# Patient Record
Sex: MALE | Race: WHITE | NOT HISPANIC OR LATINO | Employment: FULL TIME | URBAN - NONMETROPOLITAN AREA
[De-identification: names, ages, dates, MRNs, and addresses within clinical notes are randomized per-mention and may not be internally consistent; named-entity substitution may affect disease eponyms.]

---
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
## 2022-03-02 ENCOUNTER — APPOINTMENT (OUTPATIENT)
Dept: GENERAL RADIOLOGY | Facility: HOSPITAL | Age: 22
End: 2022-03-02

## 2022-03-02 ENCOUNTER — HOSPITAL ENCOUNTER (EMERGENCY)
Facility: HOSPITAL | Age: 22
Discharge: HOME | End: 2022-03-02
Attending: INTERNAL MEDICINE | Admitting: INTERNAL MEDICINE

## 2022-03-02 VITALS
WEIGHT: 180 LBS | HEART RATE: 62 BPM | TEMPERATURE: 98.7 F | RESPIRATION RATE: 16 BRPM | SYSTOLIC BLOOD PRESSURE: 109 MMHG | BODY MASS INDEX: 27.28 KG/M2 | OXYGEN SATURATION: 97 % | HEIGHT: 68 IN | DIASTOLIC BLOOD PRESSURE: 66 MMHG

## 2022-03-02 DIAGNOSIS — S93.402A SPRAIN OF LEFT ANKLE, UNSPECIFIED LIGAMENT, INITIAL ENCOUNTER: Primary | ICD-10-CM

## 2022-03-02 PROCEDURE — 73610 X-RAY EXAM OF ANKLE: CPT

## 2022-03-02 PROCEDURE — 73630 X-RAY EXAM OF FOOT: CPT

## 2022-03-02 PROCEDURE — 99283 EMERGENCY DEPT VISIT LOW MDM: CPT
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
## 2022-03-02 NOTE — ED NOTES
Pt states he was at work, mis stepped into a hole, heard ankle pop. Swelling is displayed on pt left ankle. Pain 7/10     Cira Nunez, RN  03/02/22 5218

## 2022-03-02 NOTE — ED NOTES
Spoke to Bertha in [facility] [employer]. She spoke to pt employer supervisor- she informed me the pt employer is requesting a drug test to be completed before discharged. I will inform Dr Berry regarding this.     Cira Nunez, RN  03/02/22 024

## 2022-07-01 ENCOUNTER — HOSPITAL ENCOUNTER (EMERGENCY)
Facility: HOSPITAL | Age: 22
Discharge: LEFT BEFORE DISCHARGE | End: 2022-07-01

## 2022-07-01 VITALS
SYSTOLIC BLOOD PRESSURE: 132 MMHG | WEIGHT: 175 LBS | OXYGEN SATURATION: 97 % | HEIGHT: 68 IN | BODY MASS INDEX: 26.52 KG/M2 | RESPIRATION RATE: 17 BRPM | DIASTOLIC BLOOD PRESSURE: 83 MMHG | HEART RATE: 69 BPM | TEMPERATURE: 98.2 F

## 2022-07-01 LAB
ALBUMIN SERPL-MCNC: 4.9 G/DL (ref 3.5–5.2)
ALBUMIN/GLOB SERPL: 1.8 G/DL
ALP SERPL-CCNC: 108 U/L (ref 39–117)
ALT SERPL W P-5'-P-CCNC: 10 U/L (ref 1–41)
ANION GAP SERPL CALCULATED.3IONS-SCNC: 10 MMOL/L (ref 5–15)
AST SERPL-CCNC: 19 U/L (ref 1–40)
BACTERIA UR QL AUTO: ABNORMAL /HPF
BASOPHILS # BLD AUTO: 0.03 10*3/MM3 (ref 0–0.2)
BASOPHILS NFR BLD AUTO: 0.2 % (ref 0–1.5)
BILIRUB SERPL-MCNC: 0.7 MG/DL (ref 0–1.2)
BILIRUB UR QL STRIP: ABNORMAL
BUN SERPL-MCNC: 11 MG/DL (ref 6–20)
BUN/CREAT SERPL: 13.3 (ref 7–25)
CALCIUM SPEC-SCNC: 9.6 MG/DL (ref 8.6–10.5)
CHLORIDE SERPL-SCNC: 102 MMOL/L (ref 98–107)
CLARITY UR: CLEAR
CO2 SERPL-SCNC: 25 MMOL/L (ref 22–29)
COLOR UR: ABNORMAL
CREAT SERPL-MCNC: 0.83 MG/DL (ref 0.76–1.27)
DEPRECATED RDW RBC AUTO: 34.9 FL (ref 37–54)
EGFRCR SERPLBLD CKD-EPI 2021: 128.5 ML/MIN/1.73
EOSINOPHIL # BLD AUTO: 0.07 10*3/MM3 (ref 0–0.4)
EOSINOPHIL NFR BLD AUTO: 0.5 % (ref 0.3–6.2)
ERYTHROCYTE [DISTWIDTH] IN BLOOD BY AUTOMATED COUNT: 11.4 % (ref 12.3–15.4)
GLOBULIN UR ELPH-MCNC: 2.7 GM/DL
GLUCOSE SERPL-MCNC: 148 MG/DL (ref 65–99)
GLUCOSE UR STRIP-MCNC: NEGATIVE MG/DL
HCT VFR BLD AUTO: 52.6 % (ref 37.5–51)
HGB BLD-MCNC: 18.6 G/DL (ref 13–17.7)
HGB UR QL STRIP.AUTO: NEGATIVE
HOLD SPECIMEN: NORMAL
HOLD SPECIMEN: NORMAL
HYALINE CASTS UR QL AUTO: ABNORMAL /LPF
IMM GRANULOCYTES # BLD AUTO: 0.06 10*3/MM3 (ref 0–0.05)
IMM GRANULOCYTES NFR BLD AUTO: 0.4 % (ref 0–0.5)
KETONES UR QL STRIP: ABNORMAL
LEUKOCYTE ESTERASE UR QL STRIP.AUTO: ABNORMAL
LIPASE SERPL-CCNC: 14 U/L (ref 13–60)
LYMPHOCYTES # BLD AUTO: 2.29 10*3/MM3 (ref 0.7–3.1)
LYMPHOCYTES NFR BLD AUTO: 15.3 % (ref 19.6–45.3)
MCH RBC QN AUTO: 30.3 PG (ref 26.6–33)
MCHC RBC AUTO-ENTMCNC: 35.4 G/DL (ref 31.5–35.7)
MCV RBC AUTO: 85.7 FL (ref 79–97)
MONOCYTES # BLD AUTO: 0.79 10*3/MM3 (ref 0.1–0.9)
MONOCYTES NFR BLD AUTO: 5.3 % (ref 5–12)
NEUTROPHILS NFR BLD AUTO: 11.76 10*3/MM3 (ref 1.7–7)
NEUTROPHILS NFR BLD AUTO: 78.3 % (ref 42.7–76)
NITRITE UR QL STRIP: NEGATIVE
NRBC BLD AUTO-RTO: 0 /100 WBC (ref 0–0.2)
PH UR STRIP.AUTO: 5.5 [PH] (ref 5–8)
PLATELET # BLD AUTO: 304 10*3/MM3 (ref 140–450)
PMV BLD AUTO: 8.8 FL (ref 6–12)
POTASSIUM SERPL-SCNC: 4 MMOL/L (ref 3.5–5.2)
PROT SERPL-MCNC: 7.6 G/DL (ref 6–8.5)
PROT UR QL STRIP: ABNORMAL
RBC # BLD AUTO: 6.14 10*6/MM3 (ref 4.14–5.8)
RBC # UR STRIP: ABNORMAL /HPF
REF LAB TEST METHOD: ABNORMAL
SODIUM SERPL-SCNC: 137 MMOL/L (ref 136–145)
SP GR UR STRIP: >1.03 (ref 1–1.03)
SQUAMOUS #/AREA URNS HPF: ABNORMAL /HPF
UROBILINOGEN UR QL STRIP: ABNORMAL
WBC # UR STRIP: ABNORMAL /HPF
WBC NRBC COR # BLD: 15 10*3/MM3 (ref 3.4–10.8)
WHOLE BLOOD HOLD COAG: NORMAL
WHOLE BLOOD HOLD SPECIMEN: NORMAL

## 2022-07-01 PROCEDURE — 85025 COMPLETE CBC W/AUTO DIFF WBC: CPT | Performed by: FAMILY MEDICINE

## 2022-07-01 PROCEDURE — 81001 URINALYSIS AUTO W/SCOPE: CPT | Performed by: FAMILY MEDICINE

## 2022-07-01 PROCEDURE — 99211 OFF/OP EST MAY X REQ PHY/QHP: CPT

## 2022-07-01 PROCEDURE — 80053 COMPREHEN METABOLIC PANEL: CPT | Performed by: FAMILY MEDICINE

## 2022-07-01 PROCEDURE — 83690 ASSAY OF LIPASE: CPT | Performed by: FAMILY MEDICINE

## 2022-07-01 RX ORDER — SODIUM CHLORIDE 0.9 % (FLUSH) 0.9 %
10 SYRINGE (ML) INJECTION AS NEEDED
Status: DISCONTINUED | OUTPATIENT
Start: 2022-07-01 | End: 2022-07-01 | Stop reason: HOSPADM

## 2022-07-02 NOTE — ED NOTES
Pt states he is feeling better and whatever was wrong seems to be passing. He does not want to stay. Family is asking about billing. This nurse talked to registration who suggested they call the billing office. Informed her they will get a bill for lab work that was done but unknown if insurance will cover it since pt is choosing to leave. She verbalized understanding. Pt ambulatory out of ER with family. NAD.

## 2022-07-11 ENCOUNTER — OFFICE VISIT (OUTPATIENT)
Dept: OTOLARYNGOLOGY | Facility: CLINIC | Age: 22
End: 2022-07-11

## 2022-07-11 ENCOUNTER — TELEPHONE (OUTPATIENT)
Dept: OTOLARYNGOLOGY | Facility: CLINIC | Age: 22
End: 2022-07-11

## 2022-07-11 VITALS — HEIGHT: 69 IN | WEIGHT: 192 LBS | TEMPERATURE: 97.8 F | BODY MASS INDEX: 28.44 KG/M2

## 2022-07-11 DIAGNOSIS — H65.32 CHRONIC MUCOID OTITIS MEDIA OF LEFT EAR: Primary | ICD-10-CM

## 2022-07-11 DIAGNOSIS — Z96.22 S/P MYRINGOTOMY WITH INSERTION OF TUBE: ICD-10-CM

## 2022-07-11 PROCEDURE — 99213 OFFICE O/P EST LOW 20 MIN: CPT | Performed by: NURSE PRACTITIONER

## 2022-07-11 RX ORDER — CIPROFLOXACIN AND DEXAMETHASONE 3; 1 MG/ML; MG/ML
4 SUSPENSION/ DROPS AURICULAR (OTIC) 4 TIMES DAILY
Qty: 7.5 ML | Refills: 0 | Status: SHIPPED | OUTPATIENT
Start: 2022-07-11 | End: 2022-07-21

## 2022-07-11 RX ORDER — AMOXICILLIN AND CLAVULANATE POTASSIUM 875; 125 MG/1; MG/1
1 TABLET, FILM COATED ORAL 2 TIMES DAILY
Qty: 20 TABLET | Refills: 0 | Status: SHIPPED | OUTPATIENT
Start: 2022-07-11 | End: 2022-07-21

## 2022-07-11 NOTE — TELEPHONE ENCOUNTER
Mother notified office of patient ear complaints. Call placed to patient and notified of prescription of Ciprodex being called iun and appointment on 8/4/2022 at 0915. Patient verbalized understanding.

## 2022-07-11 NOTE — PATIENT INSTRUCTIONS
Use ciprodex as given via telephone this morning  Take oral antibiotics as directed  Call for any new/worsening problems    CONTACT INFORMATION:  The main office phone number is [phone]. For emergencies after hours and on weekends, this number will convert over to our answering service and the on call provider will answer. Please try to keep non emergent phone calls/ questions to office hours 9am-5pm Monday through Friday.      WEEZEVENT  As an alternative, you can sign up and use the Epic MyChart system for more direct and quicker access for non emergent questions/ problems.  [employer] allows you to send messages to your doctor, view your test results, renew your prescriptions, schedule appointments, and more. To sign up, go to [employer] and click on the Sign Up Now link in the New User? box. Enter your WEEZEVENT Activation Code exactly as it appears below along with the last four digits of your Social Security Number and your Date of Birth () to complete the sign-up process. If you do not sign up before the expiration date, you must request a new code.     WEEZEVENT Activation Code: Activation code not generated  Current WEEZEVENT Status: Active     If you have questions, you can email [email or web address] or call [phone] to talk to our WEEZEVENT staff. Remember, WEEZEVENT is NOT to be used for urgent needs. For medical emergencies, dial 911.     IF YOU SMOKE OR USE TOBACCO PLEASE READ THE FOLLOWING:  Why is smoking bad for me?  Smoking increases the risk of heart disease, lung disease, vascular disease, stroke, and cancer. If you smoke, STOP!        IF YOU SMOKE OR USE TOBACCO PLEASE READ THE FOLLOWING:  Why is smoking bad for me?  Smoking increases the risk of heart disease, lung disease, vascular disease, stroke, and cancer. If you smoke, STOP!     For more information:  Quit Now [place]  -QUIT-NOW  https://[email or web address].quitlogix.org/en-US/

## 2022-07-11 NOTE — PROGRESS NOTES
YOB: 2000  Location: [facility]  Location Address: [address] [place] [address], [facility] [address], [address] [place] [address]  Location Phone: [phone]    Chief Complaint   Patient presents with   • Earache     Left ear    • Ear Drainage     Left ear       History of Present Illness  Dustin Gómez is a 22 y.o. male.  Dustin Gómez is here for follow up of ENT complaints. The patient has had problems with ear pain and drainage. He is s/p bilateral myringotomy in 2018.The symptoms are localized to the left ear. The patient has had mild to moderate symptoms. The symptoms have been present since last night. There have been no identified factors that aggravate the symptoms. There have been no factors that have improved the symptoms.    Procedures  Past Medical History:   Diagnosis Date   • Acute suppurative otitis media without spontaneous rupture of ear drum     unspecified ear   • Allergic rhinitis    • Anxiety and depression    • Chronic mucoid otitis media     Simple or Unspecified   • Eustachian tube dysfunction        Past Surgical History:   Procedure Laterality Date   • ADENOIDECTOMY     • MYRINGOTOMY W/ TUBES Bilateral 2016    11/15/2012::multiple (7 sets total)   • MYRINGOTOMY W/ TUBES Bilateral 2018    Procedure: MYRINGOTOMY WITH INSERTION OF EAR TUBES;  Surgeon: Darren Sarmiento MD;  Location: [facility];  Service:    • TONSILLECTOMY AND ADENOIDECTOMY     • TYMPANOSTOMY TUBE PLACEMENT         Outpatient Medications Marked as Taking for the 22 encounter (Office Visit) with Conrad Jamison APRN   Medication Sig Dispense Refill   • ciprofloxacin-dexamethasone (CIPRODEX) 0.3-0.1 % otic suspension Administer 4 drops into ear(s) as directed by provider 4 (Four) Times a Day for 10 days. 7.5 mL 0   • citalopram (CeleXA) 20 MG tablet Take 40 mg by mouth Daily.         Patient has no known allergies.    Family History   Problem Relation Age of Onset   • No Known Problems Mother    • No Known  Problems Father    • Heart disease Paternal Grandfather    • No Known Problems Brother    • No Known Problems Brother        Social History     Socioeconomic History   • Marital status: Single   Tobacco Use   • Smoking status: Current Every Day Smoker     Types: Electronic Cigarette   • Smokeless tobacco: Never Used   • Tobacco comment: Vapes   Vaping Use   • Vaping Use: Every day   • Substances: Nicotine   Substance and Sexual Activity   • Alcohol use: Never   • Drug use: No   • Sexual activity: Never       Review of Systems   Constitutional: Negative.    HENT: Positive for ear discharge and ear pain.    Respiratory: Negative.    Cardiovascular: Negative.    Gastrointestinal: Negative.    Genitourinary: Negative.    Musculoskeletal: Negative.    Skin: Negative.    Neurological: Negative.        Vitals:    07/11/22 1119   Temp: 97.8 °F (36.6 °C)       Body mass index is 28.35 kg/m².    Objective     Physical Exam  Vitals reviewed.   HENT:      Head: Normocephalic.      Right Ear: Hearing, ear canal and external ear normal. Tympanic membrane is scarred.      Left Ear: A PE tube is present. Tympanic membrane is erythematous.      Ears:      Comments: Mucoid drainage covering PE tube, cleared with suction.     Nose: Nose normal.      Mouth/Throat:      Lips: Pink.      Mouth: Mucous membranes are moist.   Neurological:      Mental Status: He is alert.         Assessment & Plan   Diagnoses and all orders for this visit:    1. Chronic mucoid otitis media of left ear (Primary)    2. S/P myringotomy with insertion of tube    Other orders  -     amoxicillin-clavulanate (Augmentin) 875-125 MG per tablet; Take 1 tablet by mouth 2 (Two) Times a Day for 10 days.  Dispense: 20 tablet; Refill: 0      * Surgery not found *  No orders of the defined types were placed in this encounter.    Return in about 10 days (around 7/21/2022) for Recheck.     Use ciprodex as given via telephone this morning  Take oral antibiotics as  directed  Call for any new/worsening problems    Patient Instructions   Use ciprodex as given via telephone this morning  Take oral antibiotics as directed  Call for any new/worsening problems    CONTACT INFORMATION:  The main office phone number is [phone]. For emergencies after hours and on weekends, this number will convert over to our answering service and the on call provider will answer. Please try to keep non emergent phone calls/ questions to office hours 9am-5pm Monday through Friday.      Helleroy  As an alternative, you can sign up and use the Epic MyChart system for more direct and quicker access for non emergent questions/ problems.  [facility] [place] [employer] allows you to send messages to your doctor, view your test results, renew your prescriptions, schedule appointments, and more. To sign up, go to [facility] and click on the Sign Up Now link in the New User? box. Enter your Helleroy Activation Code exactly as it appears below along with the last four digits of your Social Security Number and your Date of Birth () to complete the sign-up process. If you do not sign up before the expiration date, you must request a new code.     [employer]oy Activation Code: Activation code not generated  Current Helleroy Status: Active     If you have questions, you can email [email or web address] or call [phone] to talk to our Helleroy staff. Remember, Helleroy is NOT to be used for urgent needs. For medical emergencies, dial 911.     IF YOU SMOKE OR USE TOBACCO PLEASE READ THE FOLLOWING:  Why is smoking bad for me?  Smoking increases the risk of heart disease, lung disease, vascular disease, stroke, and cancer. If you smoke, STOP!        IF YOU SMOKE OR USE TOBACCO PLEASE READ THE FOLLOWING:  Why is smoking bad for me?  Smoking increases the risk of heart disease, lung disease, vascular disease, stroke, and cancer. If you smoke, STOP!     For more information:  Quit Now  [place]  1-800-QUIT-NOW  https://[place].quitlogix.org/en-US/

## 2022-07-22 ENCOUNTER — OFFICE VISIT (OUTPATIENT)
Dept: OTOLARYNGOLOGY | Facility: CLINIC | Age: 22
End: 2022-07-22

## 2022-07-22 VITALS
TEMPERATURE: 98 F | WEIGHT: 192 LBS | HEIGHT: 69 IN | HEART RATE: 70 BPM | RESPIRATION RATE: 20 BRPM | SYSTOLIC BLOOD PRESSURE: 116 MMHG | DIASTOLIC BLOOD PRESSURE: 74 MMHG | BODY MASS INDEX: 28.44 KG/M2

## 2022-07-22 DIAGNOSIS — H69.83 DYSFUNCTION OF BOTH EUSTACHIAN TUBES: Primary | ICD-10-CM

## 2022-07-22 DIAGNOSIS — H66.006 RECURRENT ACUTE SUPPURATIVE OTITIS MEDIA WITHOUT SPONTANEOUS RUPTURE OF TYMPANIC MEMBRANE OF BOTH SIDES: ICD-10-CM

## 2022-07-22 DIAGNOSIS — Z96.22 S/P MYRINGOTOMY WITH INSERTION OF TUBE: ICD-10-CM

## 2022-07-22 PROCEDURE — 99212 OFFICE O/P EST SF 10 MIN: CPT | Performed by: EMERGENCY MEDICINE

## 2022-07-22 NOTE — PROGRESS NOTES
[facility]IA[facility]  [address], [address]  [facility] [address]  Fax [phone]  Phone [phone]      Visit Type: FOLLOW UP   Chief Complaint   Patient presents with   • Follow-up     Ear infection f/u        HPI  Dustin Gómez is a 22 y.o. male who presents status post myringotomy tube insertion. The patient has had: no related complaints. The patient denies pain, fever, change of hearing and otorrhea.    Past Medical History:   Diagnosis Date   • Acute suppurative otitis media without spontaneous rupture of ear drum     unspecified ear   • Allergic rhinitis    • Anxiety and depression    • Chronic mucoid otitis media     Simple or Unspecified   • Eustachian tube dysfunction        Past Surgical History:   Procedure Laterality Date   • ADENOIDECTOMY     • MYRINGOTOMY W/ TUBES Bilateral 02/02/2016    11/15/2012::multiple (7 sets total)   • MYRINGOTOMY W/ TUBES Bilateral 2/22/2018    Procedure: MYRINGOTOMY WITH INSERTION OF EAR TUBES;  Surgeon: Darren Sarmiento MD;  Location: [facility];  Service:    • TONSILLECTOMY AND ADENOIDECTOMY     • TYMPANOSTOMY TUBE PLACEMENT         Family History: His family history includes Heart disease in his paternal grandfather; No Known Problems in his brother, brother, father, and mother.     Social History: He  reports that he has been smoking electronic cigarette. He has never used smokeless tobacco. He reports that he does not drink alcohol and does not use drugs.    Home Medications:  Cetirizine HCl and citalopram    Allergies:  He has No Known Allergies.       Vital Signs:   Temp:  [98 °F (36.7 °C)] 98 °F (36.7 °C)  Heart Rate:  [70] 70  Resp:  [20] 20  BP: (116)/(74) 116/74  ENT Physical Exam  Constitutional  Appearance: patient appears well-developed, well-nourished and well-groomed,  Communication/Voice: communication appropriate for developmental age; vocal quality  normal;  Ear  Hearing: intact;  Auricles: right auricle normal; left auricle normal;  External Mastoids: right external mastoid normal; left external mastoid normal;  Ear Canals: right ear canal normal; left ear canal normal;  Tympanic Membranes: right tympanic membrane normal;  Ear comments: Left PE tube in place dry and patent         Result Review    RESULTS REVIEW    I have reviewed the patients old records in the chart.     Assessment & Plan    Diagnoses and all orders for this visit:    1. Dysfunction of both eustachian tubes (Primary)  Overview:  Added automatically from request for surgery [id number]      2. S/P myringotomy with insertion of tube    3. Recurrent acute suppurative otitis media without spontaneous rupture of tympanic membrane of both sides          Call for ear problems, especially change of hearing, ear pain or dizziness.  Protect getting water in the ears. If needed, may use over the counter silicone plugs or a cotton ball coated with vasoline when bathing.  Use hairdryer on a cool setting after bathing.  For proper use of ear drops, push on tragus (cartilage in front of ear canal) after drop placement.      Return in about 6 months (around 1/22/2023) for Follow up with YULIANA Contreras for tube follow up.      YULIANA Buckley  07/22/22  11:24 CDT

## 2022-10-23 ENCOUNTER — HOSPITAL ENCOUNTER (EMERGENCY)
Facility: HOSPITAL | Age: 22
Discharge: HOME | End: 2022-10-23
Admitting: EMERGENCY MEDICINE

## 2022-10-23 VITALS
RESPIRATION RATE: 14 BRPM | HEART RATE: 56 BPM | OXYGEN SATURATION: 98 % | DIASTOLIC BLOOD PRESSURE: 67 MMHG | HEIGHT: 68 IN | TEMPERATURE: 98.4 F | BODY MASS INDEX: 29.4 KG/M2 | SYSTOLIC BLOOD PRESSURE: 133 MMHG | WEIGHT: 194 LBS

## 2022-10-23 DIAGNOSIS — H57.9 EYE PROBLEM: Primary | ICD-10-CM

## 2022-10-23 PROCEDURE — 99282 EMERGENCY DEPT VISIT SF MDM: CPT

## 2022-10-23 RX ORDER — TOBRAMYCIN 3 MG/ML
2 SOLUTION/ DROPS OPHTHALMIC
Qty: 5 ML | Refills: 0 | Status: SHIPPED | OUTPATIENT
Start: 2022-10-23 | End: 2022-10-30

## 2022-10-23 RX ORDER — TETRACAINE HYDROCHLORIDE 5 MG/ML
2 SOLUTION OPHTHALMIC ONCE
Status: DISCONTINUED | OUTPATIENT
Start: 2022-10-23 | End: 2022-10-23 | Stop reason: HOSPADM

## 2023-02-15 PROCEDURE — 87636 SARSCOV2 & INF A&B AMP PRB: CPT | Performed by: FAMILY MEDICINE

## 2023-09-11 ENCOUNTER — OFFICE VISIT (OUTPATIENT)
Dept: CARDIOLOGY | Facility: CLINIC | Age: 23
End: 2023-09-11
Payer: COMMERCIAL

## 2023-09-11 VITALS
BODY MASS INDEX: 37.19 KG/M2 | WEIGHT: 197 LBS | HEART RATE: 48 BPM | DIASTOLIC BLOOD PRESSURE: 77 MMHG | SYSTOLIC BLOOD PRESSURE: 133 MMHG | HEIGHT: 61 IN

## 2023-09-11 DIAGNOSIS — F41.9 ANXIETY AND DEPRESSION: ICD-10-CM

## 2023-09-11 DIAGNOSIS — R29.818 SUSPECTED SLEEP APNEA: ICD-10-CM

## 2023-09-11 DIAGNOSIS — R07.9 CHEST PAIN IN ADULT: Primary | ICD-10-CM

## 2023-09-11 DIAGNOSIS — R94.31 ABNORMAL ECG: ICD-10-CM

## 2023-09-11 DIAGNOSIS — R00.1 BRADYCARDIA, SINUS: ICD-10-CM

## 2023-09-11 DIAGNOSIS — K21.9 CHRONIC GERD: ICD-10-CM

## 2023-09-11 DIAGNOSIS — Z72.0 VAPES NICOTINE CONTAINING SUBSTANCE: ICD-10-CM

## 2023-09-11 DIAGNOSIS — F32.A ANXIETY AND DEPRESSION: ICD-10-CM

## 2023-09-11 PROBLEM — R06.83 SNORING: Status: ACTIVE | Noted: 2023-09-11

## 2023-09-11 PROCEDURE — 93000 ELECTROCARDIOGRAM COMPLETE: CPT | Performed by: INTERNAL MEDICINE

## 2023-09-11 PROCEDURE — 99204 OFFICE O/P NEW MOD 45 MIN: CPT | Performed by: INTERNAL MEDICINE

## 2023-09-11 NOTE — PROGRESS NOTES
Dustin Gómez  [id number]  2000  23 y.o.  male    Referring Provider: Corey Krishnan APRN    Reason for  Visit:  Initial visit         Subjective     Chest pain both with exertion as well as at rest.  Feels episodes of chest pain occur no more often with exertion  Moderate substernal,   Pressure like   Chest pain non pleuritic  Chest pain non positional and non gustatory   Lasts for usually 10-30 mins lately lasted fo r4 days     Started more than 5 years ago  Occurs once or twice a week on the average but can be variable in frequency  No associated diaphoresis    No associated nausea  No radiation    Relieved with rest or spontaneously  Not positional    No change with intake of food or antacids  No change with breathing  Mild to moderate associated dyspnea    No similar chest pain episodes in the past     Has had heart burn          History of present illness:  Dustin Gómez is a 23 y.o. yo male with GERD and anxiety  who presents today for   Chief Complaint   Patient presents with    [facility]     New Patient - patient stated that he can feel his heart flutter ,He has had no other testing other then a heart monitor.    .    History  Past Medical History:   Diagnosis Date    Acute suppurative otitis media without spontaneous rupture of ear drum     unspecified ear    Allergic rhinitis     Anxiety and depression     Chronic mucoid otitis media     Simple or Unspecified    Eustachian tube dysfunction    ,   Past Surgical History:   Procedure Laterality Date    ADENOIDECTOMY      MYRINGOTOMY W/ TUBES Bilateral 02/02/2016    11/15/2012::multiple (7 sets total)    MYRINGOTOMY W/ TUBES Bilateral 2/22/2018    Procedure: MYRINGOTOMY WITH INSERTION OF EAR TUBES;  Surgeon: Darren Sarmiento MD;  Location: [facility];  Service:     TONSILLECTOMY AND ADENOIDECTOMY      TYMPANOSTOMY TUBE PLACEMENT     ,   Family History   Problem Relation Age of Onset    No Known Problems Mother     No Known Problems Father   "   Heart disease Paternal Grandfather     No Known Problems Brother     No Known Problems Brother    ,   Social History     Tobacco Use    Smoking status: Former     Types: Electronic Cigarette    Smokeless tobacco: Never    Tobacco comments:     Vapes   Vaping Use    Vaping Use: Every day    Substances: Nicotine   Substance Use Topics    Alcohol use: Never    Drug use: No   ,     Medications  Current Outpatient Medications   Medication Sig Dispense Refill    escitalopram (LEXAPRO) 10 MG tablet Take 1 tablet by mouth Daily.       No current facility-administered medications for this visit.       Allergies:  Patient has no known allergies.    Review of Systems  Review of Systems   Constitutional: Negative.   HENT: Negative.     Eyes: Negative.    Cardiovascular:  Positive for chest pain. Negative for claudication, cyanosis, dyspnea on exertion, irregular heartbeat, leg swelling, near-syncope, orthopnea, palpitations, paroxysmal nocturnal dyspnea and syncope.   Respiratory: Negative.     Endocrine: Negative.    Hematologic/Lymphatic: Negative.    Skin: Negative.    Gastrointestinal:  Negative for anorexia.   Genitourinary: Negative.    Neurological: Negative.    Psychiatric/Behavioral: Negative.       Objective     Physical Exam:  /77   Pulse (!) 48   Ht 154.9 cm (61\")   Wt 89.4 kg (197 lb)   BMI 37.22 kg/m²     Physical Exam  Constitutional:       Appearance: He is well-developed.   HENT:      Head: Normocephalic.   Neck:      Vascular: Normal carotid pulses. No carotid bruit or JVD.      Trachea: No tracheal tenderness or tracheal deviation.   Cardiovascular:      Rate and Rhythm: Regular rhythm.      Pulses: Normal pulses.      Heart sounds: Normal heart sounds.   Pulmonary:      Effort: Pulmonary effort is normal.      Breath sounds: No stridor.   Abdominal:      General: There is no distension.      Palpations: Abdomen is soft.      Tenderness: There is no abdominal tenderness.   Musculoskeletal:      " Cervical back: No edema.   Skin:     General: Skin is warm.   Neurological:      Mental Status: He is alert.      Cranial Nerves: No cranial nerve deficit.      Sensory: No sensory deficit.   Psychiatric:         Speech: Speech normal.         Behavior: Behavior normal.       Results Review:     ____________________________________________________________________________________________________________________________________________  Health maintenance and recommendations    Low salt/ HTN/ Heart healthy carbohydrate restricted cardiac diet   The patient is advised to reduce or avoid caffeine or other cardiac stimulants.   Minimize or avoid  NSAID-type medications      Monitor for any signs of bleeding including red or dark stools. Fall precautions.   Advised staying uptodate with immunizations per established standard guidelines.    Offered to give patient  a copy of my notes     Questions were encouraged, asked and answered to the patient's  understanding and satisfaction. Questions if any regarding current medications and side effects, need for refills and importance of compliance to medications stressed.    Reviewed available prior notes, consults, prior visits, laboratory findings, radiology and cardiology relevant reports. Updated chart as applicable. I have reviewed the patient's medical history in detail and updated the computerized patient record as relevant.      Updated patient regarding any new or relevant abnormalities on review of records or any new findings on physical exam. Mentioned to patient about purpose of visit and desirable health short and long term goals and objectives.    Primary to monitor CBC CMP Lipid panel and TSH as applicable    ___________________________________________________________________________________________________________________________________________     ECG 12 Lead    Date/Time: 9/11/2023 8:24 AM  Performed by: Rafael Rock MD  Authorized by: Rafael Rock MD    Comparison: not compared with previous ECG   Rhythm: sinus bradycardia  Rate: bradycardic  ST Segments: ST segments normal  T Waves: T waves normal  QRS axis: normal    Clinical impression: abnormal EKG        Assessment & Plan   Diagnoses and all orders for this visit:    1. Chest pain in adult (Primary)  -     Treadmill Stress Test; Future  -     Adult Transthoracic Echo Complete w/ Color, Spectral and Contrast if necessary per protocol; Future    2. Vapes nicotine containing substance    3. Suspected sleep apnea    4. Bradycardia, sinus    5. Abnormal ECG    6. Chronic GERD    7. Anxiety and depression    Other orders  -     ECG 12 Lead          Plan      Patient expressed understanding  Encouraged and answered all questions   Discussed with the patient and all questioned fully answered. He will call me if any problems arise.   Discussed results of prior testing with patient : outpatient cardiac telemetry   as well electrocardiogram from today       Recommend evaluation for obstructive sleep apnea by primary provider, has marked sleeping bradycardia    Orders Placed This Encounter   Procedures    Treadmill Stress Test     Standing Status:   Future     Standing Expiration Date:   9/10/2024     Order Specific Question:   Reason for exam?     Answer:   Chest Pain     Order Specific Question:   Release to patient     Answer:   Routine Release [[id number]]    ECG 12 Lead     This order was created via procedure documentation     Order Specific Question:   Release to patient     Answer:   Routine Release [[id number]]    Adult Transthoracic Echo Complete w/ Color, Spectral and Contrast if necessary per protocol     Standing Status:   Future     Standing Expiration Date:   9/11/2024     Scheduling Instructions:      Myocardial strain to be performed       Use newer echo machine     Order Specific Question:   Reason for exam?     Answer:   Chest Pain     Order Specific Question:   Release to patient     Answer:    Routine Release [[id number]]        Treadmill stress says can easily run on treadmill with no fall or injury risk   Understands risk of fall and injury while on treadmill and willing to assume this risk      May need coronary CT angiography in future if chest pain persists and other tests are unrevealing for cause of chest pain    May need upper GI endoscopy       Return in about 6 weeks (around 10/23/2023).

## 2023-09-15 ENCOUNTER — PATIENT ROUNDING (BHMG ONLY) (OUTPATIENT)
Dept: CARDIOLOGY | Facility: CLINIC | Age: 23
End: 2023-09-15
Payer: COMMERCIAL

## 2023-09-15 NOTE — PROGRESS NOTES
A [employer] message has been sent to the patient for PATIENT ROUNDING with [facility].

## 2023-09-28 ENCOUNTER — HOSPITAL ENCOUNTER (OUTPATIENT)
Dept: CARDIOLOGY | Facility: HOSPITAL | Age: 23
Discharge: HOME | End: 2023-09-28
Payer: COMMERCIAL

## 2023-12-08 ENCOUNTER — OFFICE VISIT (OUTPATIENT)
Dept: OTOLARYNGOLOGY | Facility: CLINIC | Age: 23
End: 2023-12-08
Payer: COMMERCIAL

## 2023-12-08 VITALS
DIASTOLIC BLOOD PRESSURE: 69 MMHG | TEMPERATURE: 97.6 F | BODY MASS INDEX: 29.77 KG/M2 | HEART RATE: 47 BPM | WEIGHT: 201 LBS | HEIGHT: 69 IN | SYSTOLIC BLOOD PRESSURE: 143 MMHG

## 2023-12-08 DIAGNOSIS — Z96.22 S/P MYRINGOTOMY WITH INSERTION OF TUBE: ICD-10-CM

## 2023-12-08 DIAGNOSIS — H66.006 RECURRENT ACUTE SUPPURATIVE OTITIS MEDIA WITHOUT SPONTANEOUS RUPTURE OF TYMPANIC MEMBRANE OF BOTH SIDES: ICD-10-CM

## 2023-12-08 DIAGNOSIS — H69.93 DYSFUNCTION OF BOTH EUSTACHIAN TUBES: Primary | ICD-10-CM

## 2023-12-08 RX ORDER — CIPROFLOXACIN AND DEXAMETHASONE 3; 1 MG/ML; MG/ML
3 SUSPENSION/ DROPS AURICULAR (OTIC) 2 TIMES DAILY
Qty: 7.5 ML | Refills: 0 | Status: SHIPPED | OUTPATIENT
Start: 2023-12-08

## 2023-12-08 NOTE — PROGRESS NOTES
YULIANA Buckley  [facility] EAR [facility]SE & THROAT  [address], [address]  [place] [address]  Fax [phone]  Phone [phone]      Visit Type: FOLLOW UP   Chief Complaint   Patient presents with    Follow-up     L ear infection        HPI  Dustin Gómez is a 23 y.o. male who presents status post myringotomy tube insertion. .He feels like he has an infection on the left.  He reports his tube has been in place for 7 years.     Past Medical History:   Diagnosis Date    Acute suppurative otitis media without spontaneous rupture of ear drum     unspecified ear    Allergic rhinitis     Anxiety and depression     Chronic mucoid otitis media     Simple or Unspecified    Eustachian tube dysfunction        Past Surgical History:   Procedure Laterality Date    ADENOIDECTOMY      MYRINGOTOMY W/ TUBES Bilateral 02/02/2016    11/15/2012::multiple (7 sets total)    MYRINGOTOMY W/ TUBES Bilateral 2/22/2018    Procedure: MYRINGOTOMY WITH INSERTION OF EAR TUBES;  Surgeon: Darren Sarmiento MD;  Location: [facility];  Service:     TONSILLECTOMY AND ADENOIDECTOMY      TYMPANOSTOMY TUBE PLACEMENT         Family History: His family history includes Heart disease in his paternal grandfather; No Known Problems in his brother, brother, father, and mother.     Social History: He  reports that he has quit smoking. His smoking use included electronic cigarette. He has never used smokeless tobacco. He reports that he does not drink alcohol and does not use drugs.    Home Medications:  ciprofloxacin-dexAMETHasone and escitalopram    Allergies:  He has No Known Allergies.       Vital Signs:   Temp:  [97.6 °F (36.4 °C)] 97.6 °F (36.4 °C)  Heart Rate:  [47] 47  BP: (143)/(69) 143/69  ENT Physical Exam  Ear  Hearing: intact;  Auricles: bilateral auricles normal;  Ear Canals: bilateral ear canals normal;  Tympanic Membranes: left tympanic membrane tympanostomy tube noted;  tympanostomy tube with otorrhea;           Result Review    RESULTS REVIEW    I have reviewed the patients old records in the chart.     Assessment & Plan  Dysfunction of both eustachian tubes    S/P myringotomy with insertion of tube    Recurrent acute suppurative otitis media without spontaneous rupture of tympanic membrane of both sides           New Medications Ordered This Visit   Medications    ciprofloxacin-dexAMETHasone (CIPRODEX) 0.3-0.1 % otic suspension     Sig: Administer 3 drops into ear(s) as directed by provider 2 (Two) Times a Day.     Dispense:  7.5 mL     Refill:  0     Protect getting water in the ears. If needed, may use over the counter silicone plugs or a cotton ball coated with vasoline when bathing.  Use hairdryer on a cool setting after bathing.  For proper use of ear drops, push on tragus (cartilage in front of ear canal) after drop placement.  Return in about 6 months (around 6/8/2024) for Follow up with YULIANA Contreras for tube follow up.    Electronically signed by YULIANA Buckley 12/08/23 9:41 AM CST.

## 2023-12-28 ENCOUNTER — TRANSCRIBE ORDERS (OUTPATIENT)
Dept: ADMINISTRATIVE | Facility: HOSPITAL | Age: 23
End: 2023-12-28
Payer: COMMERCIAL

## 2023-12-28 ENCOUNTER — HOSPITAL ENCOUNTER (OUTPATIENT)
Dept: GENERAL RADIOLOGY | Facility: HOSPITAL | Age: 23
Discharge: HOME | End: 2023-12-28
Admitting: NURSE PRACTITIONER
Payer: COMMERCIAL

## 2023-12-28 DIAGNOSIS — M54.2 CERVICALGIA: Primary | ICD-10-CM

## 2023-12-28 DIAGNOSIS — M54.2 CERVICALGIA: ICD-10-CM

## 2023-12-28 PROCEDURE — 72040 X-RAY EXAM NECK SPINE 2-3 VW: CPT

## 2025-01-13 ENCOUNTER — TELEPHONE (OUTPATIENT)
Dept: OTOLARYNGOLOGY | Facility: CLINIC | Age: 25
End: 2025-01-13
Payer: COMMERCIAL

## 2025-01-13 NOTE — TELEPHONE ENCOUNTER
Caller: TITO MURCIA    Relationship to patient: SELF    Best call back number: [phone]    Patient is needing: PT HAS F/U APPT 1/22/25 SCHEDULED.  PT IS IN PAIN AND NO HEARING OUT OF EAR.  THIS HAS BEEN GOING ON ABOUT 3 MONTHS.  PT HAS EAR TUBES AND FEEL THEY ARE NOT DRAINING APPROPRIATELY.  PT WOULD LIKED TO BE WORKED IN 1/14/25 OR 1/15/25

## 2025-01-14 ENCOUNTER — PROCEDURE VISIT (OUTPATIENT)
Dept: OTOLARYNGOLOGY | Facility: CLINIC | Age: 25
End: 2025-01-14
Payer: COMMERCIAL

## 2025-01-14 ENCOUNTER — OFFICE VISIT (OUTPATIENT)
Dept: OTOLARYNGOLOGY | Facility: CLINIC | Age: 25
End: 2025-01-14
Payer: COMMERCIAL

## 2025-01-14 VITALS
SYSTOLIC BLOOD PRESSURE: 128 MMHG | TEMPERATURE: 98 F | DIASTOLIC BLOOD PRESSURE: 76 MMHG | WEIGHT: 213 LBS | HEART RATE: 85 BPM | BODY MASS INDEX: 31.55 KG/M2 | RESPIRATION RATE: 20 BRPM | HEIGHT: 69 IN

## 2025-01-14 DIAGNOSIS — H66.006 RECURRENT ACUTE SUPPURATIVE OTITIS MEDIA WITHOUT SPONTANEOUS RUPTURE OF TYMPANIC MEMBRANE OF BOTH SIDES: ICD-10-CM

## 2025-01-14 DIAGNOSIS — H74.42 GRANULATION POLYP OF MIDDLE EAR, LEFT: ICD-10-CM

## 2025-01-14 DIAGNOSIS — H69.92 ETD (EUSTACHIAN TUBE DYSFUNCTION), LEFT: ICD-10-CM

## 2025-01-14 DIAGNOSIS — Z01.10 HEARING WITHIN NORMAL LIMITS IN BOTH EARS: Primary | ICD-10-CM

## 2025-01-14 DIAGNOSIS — Z96.22 S/P MYRINGOTOMY WITH INSERTION OF TUBE: ICD-10-CM

## 2025-01-14 DIAGNOSIS — H69.93 DYSFUNCTION OF BOTH EUSTACHIAN TUBES: Primary | ICD-10-CM

## 2025-01-14 DIAGNOSIS — Z96.22 RETAINED MYRINGOTOMY TUBE IN LEFT EAR: ICD-10-CM

## 2025-01-14 PROCEDURE — 92557 COMPREHENSIVE HEARING TEST: CPT

## 2025-01-14 PROCEDURE — 99214 OFFICE O/P EST MOD 30 MIN: CPT | Performed by: EMERGENCY MEDICINE

## 2025-01-14 PROCEDURE — 92567 TYMPANOMETRY: CPT

## 2025-01-14 NOTE — PROGRESS NOTES
YULIANA Hobbs  [facility] EAR NOSE & THROAT  [address], [address]  [place] [address]  Fax [phone]  Phone [phone]      Visit Type: FOLLOW UP   Chief Complaint   Patient presents with    Follow-up     F/u ears           HPI      History of Present Illness  The patient is a 24-year-old male who presents for a follow-up of ear complaints.    He has been experiencing a severe ear infection for approximately 3 months, which has not shown signs of improvement despite the use of ear drops. He reports no drainage from the ear but notes intermittent auditory perception of the tube within the ear. The patient expresses concern about the potential need for a skin graft during the patching procedure. He has been using Ciprodex ear drops as part of his treatment regimen. His medical history includes the placement of a tube in the affected ear, which continues to exhibit signs of infection. The tube has been present for an extended period, estimated to be around 7 years.    He also mentions that he has not had his wisdom teeth removed, with one fully erupted tooth and two abscesses located posteriorly on the left side of his mouth. He was informed that these dental issues could be contributing to his ear infections. He has scheduled an appointment for the extraction of his wisdom teeth.    MEDICATIONS  Ciprodex    Results      Past Medical History:   Diagnosis Date    Acute suppurative otitis media without spontaneous rupture of ear drum     unspecified ear    Allergic rhinitis     Anxiety and depression     Chronic mucoid otitis media     Simple or Unspecified    Eustachian tube dysfunction        Past Surgical History:   Procedure Laterality Date    ADENOIDECTOMY      MYRINGOTOMY W/ TUBES Bilateral 02/02/2016    11/15/2012::multiple (7 sets total)    MYRINGOTOMY W/ TUBES Bilateral 2/22/2018    Procedure: MYRINGOTOMY WITH INSERTION OF EAR  TUBES;  Surgeon: Darren Sarmiento MD;  Location: [facility];  Service:     TONSILLECTOMY AND ADENOIDECTOMY      TYMPANOSTOMY TUBE PLACEMENT         Family History: His family history includes Heart disease in his paternal grandfather; No Known Problems in his brother, brother, father, and mother.     Social History: He  reports that he has quit smoking. His smoking use included electronic cigarette. He has never used smokeless tobacco. He reports that he does not drink alcohol and does not use drugs.    Home Medications:  diclofenac and escitalopram    Allergies:  He has No Known Allergies.       Vital Signs:   Temp:  [98 °F (36.7 °C)] 98 °F (36.7 °C)  Heart Rate:  [85] 85  Resp:  [20] 20  BP: (128)/(76) 128/76  ENT Physical Exam  Ear  Hearing: intact;  Auricles: bilateral auricles normal;  Ear Canals: bilateral ear canals normal;  Tympanic Membranes: right tympanic membrane normal; left tympanic membrane tympanostomy tube (granulation surrounding posterior portion) noted; tympanostomy tube with otorrhea and surrounded with wax;  Nose  External Nose: nares patent bilaterally; external nose normal;  Oral Cavity/Oropharynx  Lips: normal;  Teeth: normal;  Soft palate: normal;  Neck  Neck: neck normal;  Respiratory  Inspection: breathing unlabored; normal breathing rate;  Cardiovascular  Inspection: extremities are warm and well perfused;  Lymphatic  Palpation: lymph nodes normal;       Physical Exam  Granulation tissue is present around the ear tube.               Assessment & Plan  Dysfunction of both eustachian tubes    S/P myringotomy with insertion of tube    Recurrent acute suppurative otitis media without spontaneous rupture of tympanic membrane of both sides    Granulation polyp of middle ear, left    Retained myringotomy tube in left ear       Assessment & Plan  1. Otitis media.  The presence of granulation tissue surrounding the tympanostomy tube is noted. The potential for future tube placement was  discussed, contingent on the healing process post-myringoplasty. It was clarified that his dental issues are not the root cause of his ear infections, although they could potentially contribute to otalgia. A case request for an examination under anesthesia, coupled with a myringoplasty, will be submitted. He is advised to continue the application of Ciprodex ear drops twice daily until the surgical intervention.    2. Dental abscess.  He has had two abscesses behind his wisdom teeth on the left side of his mouth. It was explained that while these abscesses are not causing the ear infections, they could contribute to ear pain. He is advised to proceed with the scheduled appointment for wisdom teeth extraction.    PROCEDURE  The patient had a tympanostomy tube placed in the affected ear approximately 7 years ago.            Medical and surgical options were discussed including medical and surgical options. Risks, benefits and alternatives were discussed and questions were answered. After considering the options, the patient decided to proceed with surgery.     -----SURGERY SCHEDULING:-----  Schedule EXAM UNDER ANESTHESIA, MYRINGOPLASTY    ---INFORMED CONSENT DISCUSSION:---  MYRINGOTOMY TUBE REMOVAL: The risks, benefits, and alternatives of myringotomy tube removal including but not limited to pain, bleeding, infection, tympanic membrane perforation, possible need for further medical and or surgical treatment, and risks of the anesthesia were discussed full with the patient/ parents and questions were answered. No guarantees were made or implied.    AURAL POLYP REMOVAL: The risks, benefits, and alternatives of aural polyp removal including but not limited to pain, bleeding, infection, scarring, perforated tympanic membrane, and risks of the anesthesia were discussed full with the patient/ parents and questions were answered. No guarantees were made or implied.    MYRINGOPLASTY: The risks and benefits of myringoplasty  were explained including but not limited to bleeding, infection, risks of the general anesthesia, hearing loss, vertigo, aural fullness, persistent perforation, and possible need for formal tympanoplasty if the operation fails. Alternatives were discussed. The patient/parents demonstrated understanding of these risks. Questions were asked appropriately answered. No guarantees were made or implied.     ---PREOPERATIVE WORKUP:---  labs/ workup per anesthesia  No follow-ups on file.        Electronically signed by YULIANA Hobbs 01/14/25 11:58 AM CST.     Patient or patient representative verbalized consent for the use of Ambient Listening during the visit with  YULIANA Hobbs for chart documentation. 1/14/2025  11:58 CST

## 2025-01-14 NOTE — PROGRESS NOTES
AUDIOMETRIC EVALUATION      Name:  Dustin Gómez  :  2000  Age:  24 y.o.  Date of Evaluation:  2025       History:  Mr. Gómez is seen today for a hearing evaluation due to eustachian tube dysfunction at the request of YULIANA Hobbs.    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  Family history of hearing loss: No  Head trauma requiring hospital stay: multiple concussions   Chemotherapy: No  Other significant history: None    **Case history obtained in office and through EMR system      EVALUATION:        RESULTS:    Otoscopic Evaluation:  Right: minimal cerumen, tympanic membrane visualized  Left: minimal cerumen, tympanic membrane visualized and PE tube visualized  **NOTE: Testing completed after ears were examined by ENT provider    Tympanometry (226 Hz):  Right: Type Ad  Left:  Could Not Seal    Pure Tone Audiometry:    Right: Normal hearing thresholds   Left: Grossly normal hearing with mild difficulty at 250Hz and 8000Hz    Speech Audiometry:   Right: Speech Reception Threshold (SRT) was obtained at 15 dB HL  Word Recognition scores - Excellent (100)% at 55 dB, using NU-6 List 2A with 10 words  Left: Speech Reception Threshold (SRT) was obtained at 20 dB HL  Word Recognition scores - Excellent (100)% at 60 dB, using NU-6 List 2A with 10 words  SRT/PTA in good agreement bilaterally.    IMPRESSIONS:  Tympanometry showed normal middle ear pressure with increased static compliance, consistent with a hypermobile tympanic membrane, for the right ear. Could not seal for the left ear, likely due to perforation or patent PE tube. Pure tone thresholds for both ears show grossly normal  hearing, suggesting normal outer/middle ear function and normal cochlear/retrocochlear function. Patient was counseled with regard to the findings.      Diagnosis:  1. Hearing within normal limits in both ears    2. ETD (Eustachian tube dysfunction), left         RECOMMENDATIONS/PLAN:  Follow-up recommendations per Savi Harry, YULIANA.  Repeat hearing evaluation after medical intervention.  Repeat hearing evaluation if changes in hearing are noted or concerns arise.  Discussed results and recommendations with patient. Questions were addressed and the patient was encouraged to contact our department should concerns arise.        Annabel Lane, CCC-A, F-AAA  Doctor of Audiology

## 2025-01-14 NOTE — Clinical Note
EUA with tube removal and myringoplasty for Dr. AROLDO Sarmiento,please call to schedule in a day of two after he can speak with his boss

## 2025-02-13 ENCOUNTER — PRE-ADMISSION TESTING (OUTPATIENT)
Dept: PREADMISSION TESTING | Facility: HOSPITAL | Age: 25
End: 2025-02-13
Payer: COMMERCIAL

## 2025-02-13 VITALS
DIASTOLIC BLOOD PRESSURE: 70 MMHG | WEIGHT: 218.03 LBS | HEART RATE: 51 BPM | SYSTOLIC BLOOD PRESSURE: 163 MMHG | BODY MASS INDEX: 32.29 KG/M2 | OXYGEN SATURATION: 100 % | RESPIRATION RATE: 14 BRPM | HEIGHT: 69 IN

## 2025-02-13 LAB
DEPRECATED RDW RBC AUTO: 35.8 FL (ref 37–54)
ERYTHROCYTE [DISTWIDTH] IN BLOOD BY AUTOMATED COUNT: 11.7 % (ref 12.3–15.4)
HCT VFR BLD AUTO: 44.9 % (ref 37.5–51)
HGB BLD-MCNC: 15.5 G/DL (ref 13–17.7)
MCH RBC QN AUTO: 29.4 PG (ref 26.6–33)
MCHC RBC AUTO-ENTMCNC: 34.5 G/DL (ref 31.5–35.7)
MCV RBC AUTO: 85.2 FL (ref 79–97)
PLATELET # BLD AUTO: 312 10*3/MM3 (ref 140–450)
PMV BLD AUTO: 8.7 FL (ref 6–12)
RBC # BLD AUTO: 5.27 10*6/MM3 (ref 4.14–5.8)
WBC NRBC COR # BLD AUTO: 8.87 10*3/MM3 (ref 3.4–10.8)

## 2025-02-13 PROCEDURE — 85027 COMPLETE CBC AUTOMATED: CPT

## 2025-02-13 PROCEDURE — 36415 COLL VENOUS BLD VENIPUNCTURE: CPT

## 2025-02-13 RX ORDER — CETIRIZINE HYDROCHLORIDE 10 MG/1
10 TABLET ORAL DAILY
COMMUNITY

## 2025-02-13 RX ORDER — OMEPRAZOLE 40 MG/1
40 CAPSULE, DELAYED RELEASE ORAL DAILY
COMMUNITY

## 2025-02-13 NOTE — DISCHARGE INSTRUCTIONS

## 2025-02-20 ENCOUNTER — TELEPHONE (OUTPATIENT)
Dept: OTOLARYNGOLOGY | Facility: CLINIC | Age: 25
End: 2025-02-20
Payer: COMMERCIAL

## 2025-02-20 ENCOUNTER — ANESTHESIA (OUTPATIENT)
Dept: PERIOP | Facility: HOSPITAL | Age: 25
End: 2025-02-20
Payer: COMMERCIAL

## 2025-02-20 ENCOUNTER — ANESTHESIA EVENT (OUTPATIENT)
Dept: PERIOP | Facility: HOSPITAL | Age: 25
End: 2025-02-20
Payer: COMMERCIAL

## 2025-02-20 ENCOUNTER — HOSPITAL ENCOUNTER (OUTPATIENT)
Facility: HOSPITAL | Age: 25
Setting detail: HOSPITAL OUTPATIENT SURGERY
Discharge: HOME | End: 2025-02-20
Attending: OTOLARYNGOLOGY | Admitting: OTOLARYNGOLOGY
Payer: COMMERCIAL

## 2025-02-20 VITALS
RESPIRATION RATE: 18 BRPM | SYSTOLIC BLOOD PRESSURE: 107 MMHG | OXYGEN SATURATION: 99 % | DIASTOLIC BLOOD PRESSURE: 57 MMHG | TEMPERATURE: 98.1 F | HEART RATE: 56 BPM

## 2025-02-20 DIAGNOSIS — Z96.22 S/P MYRINGOTOMY WITH INSERTION OF TUBE: ICD-10-CM

## 2025-02-20 DIAGNOSIS — Z96.22 RETAINED MYRINGOTOMY TUBE IN LEFT EAR: ICD-10-CM

## 2025-02-20 DIAGNOSIS — H66.006 RECURRENT ACUTE SUPPURATIVE OTITIS MEDIA WITHOUT SPONTANEOUS RUPTURE OF TYMPANIC MEMBRANE OF BOTH SIDES: ICD-10-CM

## 2025-02-20 DIAGNOSIS — H69.93 DYSFUNCTION OF BOTH EUSTACHIAN TUBES: ICD-10-CM

## 2025-02-20 DIAGNOSIS — H74.42 GRANULATION POLYP OF MIDDLE EAR, LEFT: ICD-10-CM

## 2025-02-20 PROCEDURE — 25810000003 LACTATED RINGERS PER 1000 ML: Performed by: OTOLARYNGOLOGY

## 2025-02-20 PROCEDURE — 69620 MYRINGOPLASTY: CPT | Performed by: OTOLARYNGOLOGY

## 2025-02-20 PROCEDURE — 25010000002 PROPOFOL 10 MG/ML EMULSION: Performed by: NURSE ANESTHETIST, CERTIFIED REGISTERED

## 2025-02-20 PROCEDURE — 25010000002 LIDOCAINE PF 2% 2 % SOLUTION: Performed by: NURSE ANESTHETIST, CERTIFIED REGISTERED

## 2025-02-20 DEVICE — HEMOST ABS SURGIFOAM SZ100 8X12 10MM: Type: IMPLANTABLE DEVICE | Site: EAR | Status: FUNCTIONAL

## 2025-02-20 RX ORDER — HYDROCODONE BITARTRATE AND ACETAMINOPHEN 5; 325 MG/1; MG/1
1 TABLET ORAL EVERY 4 HOURS PRN
Status: DISCONTINUED | OUTPATIENT
Start: 2025-02-20 | End: 2025-02-20 | Stop reason: HOSPADM

## 2025-02-20 RX ORDER — LIDOCAINE HYDROCHLORIDE 20 MG/ML
INJECTION, SOLUTION EPIDURAL; INFILTRATION; INTRACAUDAL; PERINEURAL AS NEEDED
Status: DISCONTINUED | OUTPATIENT
Start: 2025-02-20 | End: 2025-02-20 | Stop reason: SURG

## 2025-02-20 RX ORDER — ONDANSETRON 2 MG/ML
4 INJECTION INTRAMUSCULAR; INTRAVENOUS ONCE AS NEEDED
Status: DISCONTINUED | OUTPATIENT
Start: 2025-02-20 | End: 2025-02-20 | Stop reason: HOSPADM

## 2025-02-20 RX ORDER — MIDAZOLAM HYDROCHLORIDE 2 MG/2ML
1 INJECTION, SOLUTION INTRAMUSCULAR; INTRAVENOUS
Status: DISCONTINUED | OUTPATIENT
Start: 2025-02-20 | End: 2025-02-20 | Stop reason: HOSPADM

## 2025-02-20 RX ORDER — FENTANYL CITRATE 50 UG/ML
50 INJECTION, SOLUTION INTRAMUSCULAR; INTRAVENOUS
Status: DISCONTINUED | OUTPATIENT
Start: 2025-02-20 | End: 2025-02-20 | Stop reason: HOSPADM

## 2025-02-20 RX ORDER — ACETAMINOPHEN 500 MG
1000 TABLET ORAL ONCE
Status: COMPLETED | OUTPATIENT
Start: 2025-02-20 | End: 2025-02-20

## 2025-02-20 RX ORDER — SODIUM CHLORIDE, SODIUM LACTATE, POTASSIUM CHLORIDE, CALCIUM CHLORIDE 600; 310; 30; 20 MG/100ML; MG/100ML; MG/100ML; MG/100ML
1000 INJECTION, SOLUTION INTRAVENOUS CONTINUOUS
Status: DISCONTINUED | OUTPATIENT
Start: 2025-02-20 | End: 2025-02-20 | Stop reason: HOSPADM

## 2025-02-20 RX ORDER — SODIUM CHLORIDE 0.9 % (FLUSH) 0.9 %
3 SYRINGE (ML) INJECTION EVERY 12 HOURS SCHEDULED
Status: DISCONTINUED | OUTPATIENT
Start: 2025-02-20 | End: 2025-02-20 | Stop reason: HOSPADM

## 2025-02-20 RX ORDER — SODIUM CHLORIDE, SODIUM LACTATE, POTASSIUM CHLORIDE, CALCIUM CHLORIDE 600; 310; 30; 20 MG/100ML; MG/100ML; MG/100ML; MG/100ML
100 INJECTION, SOLUTION INTRAVENOUS CONTINUOUS
Status: DISCONTINUED | OUTPATIENT
Start: 2025-02-20 | End: 2025-02-20 | Stop reason: HOSPADM

## 2025-02-20 RX ORDER — NALOXONE HCL 0.4 MG/ML
0.4 VIAL (ML) INJECTION AS NEEDED
Status: DISCONTINUED | OUTPATIENT
Start: 2025-02-20 | End: 2025-02-20 | Stop reason: HOSPADM

## 2025-02-20 RX ORDER — HYDROCODONE BITARTRATE AND ACETAMINOPHEN 10; 325 MG/1; MG/1
1 TABLET ORAL EVERY 4 HOURS PRN
Status: DISCONTINUED | OUTPATIENT
Start: 2025-02-20 | End: 2025-02-20 | Stop reason: HOSPADM

## 2025-02-20 RX ORDER — IBUPROFEN 600 MG/1
600 TABLET, FILM COATED ORAL EVERY 6 HOURS PRN
Status: DISCONTINUED | OUTPATIENT
Start: 2025-02-20 | End: 2025-02-20 | Stop reason: HOSPADM

## 2025-02-20 RX ORDER — PROPOFOL 10 MG/ML
VIAL (ML) INTRAVENOUS AS NEEDED
Status: DISCONTINUED | OUTPATIENT
Start: 2025-02-20 | End: 2025-02-20 | Stop reason: SURG

## 2025-02-20 RX ORDER — SODIUM CHLORIDE 9 MG/ML
40 INJECTION, SOLUTION INTRAVENOUS AS NEEDED
Status: DISCONTINUED | OUTPATIENT
Start: 2025-02-20 | End: 2025-02-20 | Stop reason: HOSPADM

## 2025-02-20 RX ORDER — SODIUM CHLORIDE 0.9 % (FLUSH) 0.9 %
3 SYRINGE (ML) INJECTION AS NEEDED
Status: DISCONTINUED | OUTPATIENT
Start: 2025-02-20 | End: 2025-02-20 | Stop reason: HOSPADM

## 2025-02-20 RX ORDER — FLUMAZENIL 0.1 MG/ML
0.2 INJECTION INTRAVENOUS AS NEEDED
Status: DISCONTINUED | OUTPATIENT
Start: 2025-02-20 | End: 2025-02-20 | Stop reason: HOSPADM

## 2025-02-20 RX ORDER — LABETALOL HYDROCHLORIDE 5 MG/ML
5 INJECTION, SOLUTION INTRAVENOUS
Status: DISCONTINUED | OUTPATIENT
Start: 2025-02-20 | End: 2025-02-20 | Stop reason: HOSPADM

## 2025-02-20 RX ORDER — SODIUM CHLORIDE 0.9 % (FLUSH) 0.9 %
3-10 SYRINGE (ML) INJECTION AS NEEDED
Status: DISCONTINUED | OUTPATIENT
Start: 2025-02-20 | End: 2025-02-20 | Stop reason: HOSPADM

## 2025-02-20 RX ADMIN — PROPOFOL 100 MG: 10 INJECTION, EMULSION INTRAVENOUS at 12:20

## 2025-02-20 RX ADMIN — PROPOFOL 50 MG: 10 INJECTION, EMULSION INTRAVENOUS at 12:22

## 2025-02-20 RX ADMIN — ACETAMINOPHEN 1000 MG: 500 TABLET, FILM COATED ORAL at 11:52

## 2025-02-20 RX ADMIN — SODIUM CHLORIDE, POTASSIUM CHLORIDE, SODIUM LACTATE AND CALCIUM CHLORIDE 1000 ML: 600; 310; 30; 20 INJECTION, SOLUTION INTRAVENOUS at 09:05

## 2025-02-20 RX ADMIN — HYDROCODONE BITARTRATE AND ACETAMINOPHEN 1 TABLET: 5; 325 TABLET ORAL at 12:50

## 2025-02-20 RX ADMIN — LIDOCAINE HYDROCHLORIDE 100 MG: 20 INJECTION, SOLUTION EPIDURAL; INFILTRATION; INTRACAUDAL; PERINEURAL at 12:22

## 2025-02-20 RX ADMIN — PROPOFOL 100 MG: 10 INJECTION, EMULSION INTRAVENOUS at 12:18

## 2025-02-20 NOTE — OP NOTE
Darren Sarmiento MD   Operative Note    Dustin Gómez  2/20/2025    Pre-op Diagnosis:   Dysfunction of both eustachian tubes [H69.93]  S/P myringotomy with insertion of tube [Z96.22]  Recurrent acute suppurative otitis media without spontaneous rupture of tympanic membrane of both sides [H66.006]  Granulation polyp of middle ear, left [H74.42]  Retained myringotomy tube in left ear [Z96.22]    Post-op Diagnosis:     Post-Op Diagnosis Codes:     * Dysfunction of both eustachian tubes [H69.93]     * S/P myringotomy with insertion of tube [Z96.22]     * Recurrent acute suppurative otitis media without spontaneous rupture of tympanic membrane of both sides [H66.006]     * Granulation polyp of middle ear, left [H74.42]     * Retained myringotomy tube in left ear [Z96.22]    Procedure/CPT® Codes:  AZ OTOLARYNGOLOGIC EXAM UNDER GENERAL ANESTHESIA [50613]  AZ MYRINGOPLASTY [79403]    Procedure(s):  EXAM UNDER ANESTHESIA (Bilateral)  MYRINGOPLASTY (Left)     Surgeon(s):  Darren Sarmiento MD    Anesthesia: General    Staff:   Circulator: Ricardo Gottlieb RN  Scrub Person: Shante Herbert    Estimated Blood Loss:   minimal    Specimens:                None      Drains:   none    Findings:   There is a retained tube in the left tympanic membrane.  This was an old Paperella type II tube.  There was retraction of the posterior aspect of the right eardrum.  There was no evidence of cholesteatoma or effusion    Complications:   none    Reason for the Operation:  Dustin Gómez is a 24 y.o. male with a history of chronic ear disease.  He has had a left Paperella tube for many years and has been having intermittent drainage.  Removal was requested.  After understanding the risks, benefits and alternatives, a consent for the operation was given.     Procedure Description:  The patient was taken back to the operating room, placed supine on the operating table and placed under anesthesia by the anesthesia staff. Once this  was done a time out was performed to confirm the patient and the proper procedure.  Once this was on the operative microscope was wheeled into view.  Using a speculum and a curette the external auditory canal was cleaned of its cerumen.  Once this was done the Paperella tube was gently removed with a alligator.  The rim of the perforation was freshened up with a Tee pick.  An alligator was used to remove the squamous debris.  I then placed Gelfoam on the outer surface of the eardrum to patch the eardrum.  I then inspected the opposite ear with the microscope.  The eardrum was intact there was a shallow retraction in the posterior aspect of the eardrum without evidence of effusion or cholesteatoma.  The patient was then turned over to the anesthesia team and allowed to wake from anesthesia. The patient was transported to the recovery room in a stable condition.                Darren Sarmiento MD     Date: 2/20/2025  Time: 12:23 CST

## 2025-02-20 NOTE — H&P
[facility]   PREOPERATIVE HISTORY AND PHYSICAL    Patient Name:Dustin Gómez  : 2000  MRN: [id number]  Primary Care Physician: Marlena Gonzalez APRN  Date of admission: 2025    Subjective   Subjective     Chief Complaint: preoperative evaluation    History of Present Illness  Dustin Gómez is a 24 y.o. male who presents for preoperative evaluation. He is scheduled for EXAM UNDER ANESTHESIA (N/A), MYRINGOPLASTY (N/A)    Personal History     Past Medical History:   Diagnosis Date   • Acute suppurative otitis media without spontaneous rupture of ear drum     unspecified ear   • Allergic rhinitis    • Anxiety and depression    • Chronic mucoid otitis media     Simple or Unspecified   • Eustachian tube dysfunction        Past Surgical History:   Procedure Laterality Date   • MYRINGOTOMY W/ TUBES Bilateral 2016    11/15/2012::multiple (7 sets total)   • MYRINGOTOMY W/ TUBES Bilateral 2018    Procedure: MYRINGOTOMY WITH INSERTION OF EAR TUBES;  Surgeon: Darren Sarmiento MD;  Location: [facility];  Service:    • TONSILLECTOMY AND ADENOIDECTOMY     • TYMPANOSTOMY TUBE PLACEMENT         Family History: His family history includes Heart disease in his paternal grandfather; No Known Problems in his brother, brother, father, and mother.     Social History: He  reports that he has quit smoking. His smoking use included electronic cigarette. He has never used smokeless tobacco. He reports that he does not drink alcohol and does not use drugs.    Home Medications:  cetirizine, diclofenac, escitalopram, and omeprazole    Allergies:  He has No Known Allergies.    Objective    Objective     Vitals:    Temp:  [96 °F (35.6 °C)] 96 °F (35.6 °C)  Heart Rate:  [54-56] 56  Resp:  [16-18] 16  BP: ()/(65-76) 121/65    EXAM  CONSTITUTIONAL: well nourished, well-developed, alert, oriented, in no acute distress   COMMUNICATION AND VOICE: able to communicate normally, normal voice quality  HEAD:  normocephalic, no lesions, atraumatic, no tenderness, no masses   FACE: appearance normal, no lesions, no tenderness, no deformities, facial motion symmetric  EYES: ocular motility normal, eyelids normal, orbits normal, no proptosis, conjunctiva normal , pupils equal, round   EARS:  Hearing: hearing to conversational voice intact bilaterally   External Ears: normal bilaterally, no lesions  NOSE:  External Nose: external nasal structure normal, no tenderness on palpation, no nasal discharge, no lesions, no evidence of trauma, nostrils patent   ORAL:  Lips: upper and lower lips without lesion   NECK:  Inspection and Palpation: neck appearance normal, no masses or tenderness  CHEST/RESPIRATORY: normal respiratory effort   CARDIOVASCULAR: no cyanosis or edema   NEUROLOGICAL/PSYCHIATRIC: oriented appropriately for age, mood normal, affect appropriate, CN II-XII intact grossly       Assessment & Plan   Assessment / Plan     Brief Patient Summary:  Dustin Gómez is a 24 y.o. male who presents for preoperative evaluation.    Pre-Op Diagnosis Codes:      * Dysfunction of both eustachian tubes [H69.93]     * S/P myringotomy with insertion of tube [Z96.22]     * Recurrent acute suppurative otitis media without spontaneous rupture of tympanic membrane of both sides [H66.006]     * Granulation polyp of middle ear, left [H74.42]     * Retained myringotomy tube in left ear [Z96.22]    Active Hospital Problems:  Active Hospital Problems    Diagnosis    • Granulation polyp of middle ear, left    • Retained myringotomy tube in left ear    • S/P myringotomy with insertion of tube    • Recurrent acute suppurative otitis media without spontaneous rupture of tympanic membrane of both sides    • Dysfunction of both eustachian tubes      Plan:   Procedure(s):  EXAM UNDER ANESTHESIA  MYRINGOPLASTY    MYRINGOPLASTY: The risks and benefits of myringoplasty were explained including but not limited to bleeding, infection, risks of the  general anesthesia, hearing loss, vertigo, aural fullness, persistent perforation, and possible need for formal tympanoplasty if the operation fails. Alternatives were discussed. The patient/parents demonstrated understanding of these risks. Questions were asked appropriately answered. No guarantees were made or implied.     Darren Sarmiento MD

## 2025-02-20 NOTE — ANESTHESIA POSTPROCEDURE EVALUATION
Patient: Dustin Gómez    Procedure Summary       Date: 02/20/25 Room / Location:  [facility] /  [facility]    Anesthesia Start: 1210 Anesthesia Stop: 1239    Procedures:       EXAM UNDER ANESTHESIA (Bilateral: Ear)      MYRINGOPLASTY (Left: Ear) Diagnosis:       Dysfunction of both eustachian tubes      S/P myringotomy with insertion of tube      Recurrent acute suppurative otitis media without spontaneous rupture of tympanic membrane of both sides      Granulation polyp of middle ear, left      Retained myringotomy tube in left ear      (Dysfunction of both eustachian tubes [H69.93])      (S/P myringotomy with insertion of tube [Z96.22])      (Recurrent acute suppurative otitis media without spontaneous rupture of tympanic membrane of both sides [H66.006])      (Granulation polyp of middle ear, left [H74.42])      (Retained myringotomy tube in left ear [Z96.22])    Surgeons: Darren Sarmiento MD Provider: Hilario Burnett CRNA    Anesthesia Type: general ASA Status: 2            Anesthesia Type: general    Vitals  Vitals Value Taken Time   /129 02/20/25 1237   Temp     Pulse 51 02/20/25 1240   Resp     SpO2 96 % 02/20/25 1240   Vitals shown include unfiled device data.        Anesthesia Post Evaluation

## 2025-02-20 NOTE — ANESTHESIA POSTPROCEDURE EVALUATION
Patient: Dustin Gómez    Procedure Summary       Date: 02/20/25 Room / Location:  [facility] /  [facility]    Anesthesia Start: 1210 Anesthesia Stop:     Procedures:       EXAM UNDER ANESTHESIA (Bilateral: Ear)      MYRINGOPLASTY (Left: Ear) Diagnosis:       Dysfunction of both eustachian tubes      S/P myringotomy with insertion of tube      Recurrent acute suppurative otitis media without spontaneous rupture of tympanic membrane of both sides      Granulation polyp of middle ear, left      Retained myringotomy tube in left ear      (Dysfunction of both eustachian tubes [H69.93])      (S/P myringotomy with insertion of tube [Z96.22])      (Recurrent acute suppurative otitis media without spontaneous rupture of tympanic membrane of both sides [H66.006])      (Granulation polyp of middle ear, left [H74.42])      (Retained myringotomy tube in left ear [Z96.22])    Surgeons: Darren Sarmiento MD Provider: Hilario Burnett CRNA    Anesthesia Type: general ASA Status: 2            Anesthesia Type: general    Vitals  Vitals Value Taken Time   /129 02/20/25 1237   Temp     Pulse 61 02/20/25 1238   Resp     SpO2 95 % 02/20/25 1238   Vitals shown include unfiled device data.        Post Anesthesia Care and Evaluation    Patient location during evaluation: PACU  Patient participation: complete - patient participated  Level of consciousness: awake and alert  Pain management: adequate    Airway patency: patent  Anesthetic complications: No anesthetic complications    Cardiovascular status: acceptable  Respiratory status: acceptable  Hydration status: acceptable    Comments: Blood pressure 121/65, pulse 56, temperature 96 °F (35.6 °C), temperature source Temporal, resp. rate 16, SpO2 98%.    Pt discharged from PACU based on kristin score >8  No anesthesia care post op

## 2025-02-20 NOTE — ANESTHESIA PREPROCEDURE EVALUATION
Anesthesia Evaluation     no history of anesthetic complications:   NPO Solid Status: > 8 hours  NPO Liquid Status: > 8 hours           Airway   Mallampati: II  No difficulty expected  Dental      Pulmonary    (+) a smoker vape,  (-) sleep apnea  Cardiovascular   Exercise tolerance: good (4-7 METS)    (+) dysrhythmias Bradycardia  (-) hypertension      Neuro/Psych  (-) seizures, TIA, CVA  GI/Hepatic/Renal/Endo    (+) obesity, GERD  (-) liver disease, no renal disease, diabetes    Musculoskeletal     Abdominal    Substance History      OB/GYN          Other                    Anesthesia Plan    ASA 2     general     intravenous induction     Anesthetic plan, risks, benefits, and alternatives have been provided, discussed and informed consent has been obtained with: patient.    CODE STATUS:

## 2025-02-21 ENCOUNTER — TELEPHONE (OUTPATIENT)
Dept: OTOLARYNGOLOGY | Facility: CLINIC | Age: 25
End: 2025-02-21
Payer: COMMERCIAL

## 2025-02-21 NOTE — TELEPHONE ENCOUNTER
Caller: Tiffanie Gómez    Relationship: Mother    Best call back number: [phone]    What form or medical record are you requesting: NOTE FOR WORK STATING WHAT KIND OF PAIN MEDICATION WAS GIVEN AFTER SURGERY ON 02.20.2025    Who is requesting this form or medical record from you: [facility] ([facility])    How would you like to receive the form or medical records (pick-up, mail, fax): FAX  If fax, what is the fax number: [phone]      Timeframe paperwork needed: ASAP    Additional notes: THANK YOU

## 2025-04-16 ENCOUNTER — OFFICE VISIT (OUTPATIENT)
Dept: OTOLARYNGOLOGY | Facility: CLINIC | Age: 25
End: 2025-04-16
Payer: COMMERCIAL

## 2025-04-16 VITALS — HEIGHT: 69 IN | BODY MASS INDEX: 32.29 KG/M2 | WEIGHT: 218.03 LBS

## 2025-04-16 DIAGNOSIS — H69.93 DYSFUNCTION OF BOTH EUSTACHIAN TUBES: ICD-10-CM

## 2025-04-16 DIAGNOSIS — H69.92 ETD (EUSTACHIAN TUBE DYSFUNCTION), LEFT: Primary | ICD-10-CM

## 2025-04-16 DIAGNOSIS — Z98.890 HISTORY OF MYRINGOPLASTY: ICD-10-CM

## 2025-04-16 NOTE — PROGRESS NOTES
YULIANA Hobbs  [facility]  [address], [address]  [facility] [address]  Fax [phone]  Phone [phone]      Visit Type: FOLLOW UP   Chief Complaint   Patient presents with    Post-op Follow-up     Myringotomy 2/20/25  Pt says for a few weeks after surgery he felt like he had an ear infection. Last time he felt like that was a couple of weeks ago. Ears have not been draining.            HISTORY OBTAINED FROM: patient  HPI  Dustin Gómez is a 24 y.o.  male who presents for follow up s/p Exam Under Anesthesia - Bilateral and Myringoplasty - Left on 2/20/2025. The patient has had a relatively normal postoperative course and currently has no related complaints.    Past Medical History:   Diagnosis Date    Acute suppurative otitis media without spontaneous rupture of ear drum     unspecified ear    Allergic rhinitis     Anxiety and depression     Chronic mucoid otitis media     Simple or Unspecified    Eustachian tube dysfunction        Past Surgical History:   Procedure Laterality Date    EXAM UNDER ANESTHESIA Bilateral 2/20/2025    Procedure: EXAM UNDER ANESTHESIA;  Surgeon: Darren Sarmiento MD;  Location: [facility];  Service: ENT;  Laterality: Bilateral;    MYRINGOPLASTY Left 2/20/2025    Procedure: MYRINGOPLASTY;  Surgeon: Darren Sarmiento MD;  Location: [facility];  Service: ENT;  Laterality: Left;    MYRINGOTOMY W/ TUBES Bilateral 02/02/2016    11/15/2012::multiple (7 sets total)    MYRINGOTOMY W/ TUBES Bilateral 02/22/2018    Procedure: MYRINGOTOMY WITH INSERTION OF EAR TUBES;  Surgeon: Darren Sarmiento MD;  Location: [facility];  Service:     TONSILLECTOMY AND ADENOIDECTOMY      TYMPANOSTOMY TUBE PLACEMENT         Family History: His family history includes Heart disease in his paternal grandfather; No Known Problems in his brother, brother, father, and mother.     Social History: He  reports that he  has quit smoking. His smoking use included electronic cigarette. He has never used smokeless tobacco. He reports that he does not drink alcohol and does not use drugs.    Home Medications:  cetirizine, diclofenac, escitalopram, and omeprazole    Allergies:  He has no known allergies.       Vital Signs:      ENT Physical Exam  Ear  Hearing: intact;  Auricles: bilateral auricles normal;  Ear Canals: bilateral ear canals normal;  Tympanic Membranes: right tympanic membrane retracted; left tympanic membrane perforated;                    Assessment & Plan  ETD (Eustachian tube dysfunction), left    Dysfunction of both eustachian tubes    History of myringoplasty         Protect getting water in the ears. If needed, may use over the counter silicone plugs or a cotton ball coated with vasoline when bathing.  Use hairdryer on a cool setting after bathing.  For proper use of ear drops, push on tragus (cartilage in front of ear canal) after drop placement.  Return in about 6 weeks (around 5/28/2025) for Follow up with YULIANA Hobbs, with audiogram.        Electronically signed by YULIANA Hobbs 04/16/25 1:35 PM CDT.

## 2025-05-27 ENCOUNTER — PROCEDURE VISIT (OUTPATIENT)
Dept: OTOLARYNGOLOGY | Facility: CLINIC | Age: 25
End: 2025-05-27
Payer: COMMERCIAL

## 2025-05-27 ENCOUNTER — OFFICE VISIT (OUTPATIENT)
Dept: OTOLARYNGOLOGY | Facility: CLINIC | Age: 25
End: 2025-05-27
Payer: COMMERCIAL

## 2025-05-27 VITALS
WEIGHT: 216.2 LBS | DIASTOLIC BLOOD PRESSURE: 75 MMHG | HEART RATE: 80 BPM | BODY MASS INDEX: 32.02 KG/M2 | SYSTOLIC BLOOD PRESSURE: 124 MMHG | HEIGHT: 69 IN | TEMPERATURE: 97.5 F

## 2025-05-27 DIAGNOSIS — Z01.10 HEARING WITHIN NORMAL LIMITS IN BOTH EARS: ICD-10-CM

## 2025-05-27 DIAGNOSIS — H72.92 TYMPANIC MEMBRANE PERFORATION, LEFT: ICD-10-CM

## 2025-05-27 DIAGNOSIS — Z98.890 HISTORY OF MYRINGOPLASTY: ICD-10-CM

## 2025-05-27 DIAGNOSIS — H72.92 PERFORATION OF LEFT TYMPANIC MEMBRANE: Primary | ICD-10-CM

## 2025-05-27 DIAGNOSIS — H69.92 ETD (EUSTACHIAN TUBE DYSFUNCTION), LEFT: Primary | ICD-10-CM

## 2025-05-27 PROCEDURE — 92567 TYMPANOMETRY: CPT

## 2025-05-27 PROCEDURE — 92557 COMPREHENSIVE HEARING TEST: CPT

## 2025-05-27 PROCEDURE — 99213 OFFICE O/P EST LOW 20 MIN: CPT | Performed by: EMERGENCY MEDICINE

## 2025-05-27 NOTE — PROGRESS NOTES
YULIANA Hobbs  [facility]  [address], [address]  [facility] [address]  Fax [phone]  Phone [phone]      Visit Type: FOLLOW UP   Chief Complaint   Patient presents with    Ear Problem     No new complaints with ears.             HPI      History of Present Illness  The patient is a 24-year-old male here for a follow-up visit. He had an old perforation with granulation tissue removed on 02/20/2025. A myringoplasty was performed at that visit. Follow-up in 04/2025 revealed a persistent perforation.    He reports no current issues with his ears.    Results  Testing  Hearing test shows markedly improved hearing on the left hand side from preoperative hearing test, however unable to seal tympanogram on the left hand side consistent with perforation. Right tympanic membrane with type A tympanogram.    Past Medical History:   Diagnosis Date    Acute suppurative otitis media without spontaneous rupture of ear drum     unspecified ear    Allergic rhinitis     Anxiety and depression     Chronic mucoid otitis media     Simple or Unspecified    Eustachian tube dysfunction        Past Surgical History:   Procedure Laterality Date    EXAM UNDER ANESTHESIA Bilateral 2/20/2025    Procedure: EXAM UNDER ANESTHESIA;  Surgeon: Darren Sarmiento MD;  Location: [facility] OR;  Service: ENT;  Laterality: Bilateral;    MYRINGOPLASTY Left 2/20/2025    Procedure: MYRINGOPLASTY;  Surgeon: Darren Sarmiento MD;  Location: [facility] OR;  Service: ENT;  Laterality: Left;    MYRINGOTOMY W/ TUBES Bilateral 02/02/2016    11/15/2012::multiple (7 sets total)    MYRINGOTOMY W/ TUBES Bilateral 02/22/2018    Procedure: MYRINGOTOMY WITH INSERTION OF EAR TUBES;  Surgeon: Darren Sarmiento MD;  Location: [facility] OR;  Service:     TONSILLECTOMY AND ADENOIDECTOMY      TYMPANOSTOMY TUBE PLACEMENT         Family History: His family history includes Heart  disease in his paternal grandfather; No Known Problems in his brother, brother, father, and mother.     Social History: He  reports that he has quit smoking. His smoking use included electronic cigarette. He has never used smokeless tobacco. He reports that he does not drink alcohol and does not use drugs.    Home Medications:  cetirizine, diclofenac, escitalopram, and omeprazole    Allergies:  He has no known allergies.       Vital Signs:   Temp:  [97.5 °F (36.4 °C)] 97.5 °F (36.4 °C)  Heart Rate:  [80] 80  BP: (124)/(75) 124/75  ENT Physical Exam  Ear  Hearing: intact;  Auricles: right auricle normal; left auricle normal;  External Mastoids: right external mastoid normal; left external mastoid normal;  Ear Canals: right ear canal normal; left ear canal normal;  Tympanic Membranes: right tympanic membrane retracted; left tympanic membrane perforated; central perforation perforation size: 10%;       Physical Exam          Result Review       RESULTS REVIEW    I have reviewed the patients old records in the chart.   The following results/records were reviewed:   Op Note by Darren Sarmiento MD (02/20/2025 12:18)   Office Visit with Darren Sarmiento MD (04/16/2025)   Procedure visit with Mary Beth Poe Au.D (05/27/2025)        Assessment & Plan  ETD (Eustachian tube dysfunction), left    History of myringoplasty    Tympanic membrane perforation, left       Assessment & Plan  1. Persistent tympanic membrane perforation.  The patient's body may be maintaining the perforation for ventilation purposes. There is no significant drainage or hearing loss at this time. A hearing test will be conducted during the next visit in 1 year. The condition will be monitored annually unless complications arise in the interim.    PROCEDURE  Granulation tissue was removed on 02/20/2025. Myringoplasty was performed on 02/20/2025.     Orders Placed This Encounter   Procedures    Comprehensive Hearing Test           Return in  about 1 year (around 5/27/2026) for Follow up with YULIANA Hobbs, with audiogram.        Electronically signed by YULIANA Hobbs 05/27/25 11:32 AM CDT.     Patient or patient representative verbalized consent for the use of Ambient Listening during the visit with  YULIANA Hobbs for chart documentation. 5/27/2025  11:32 CDT

## 2025-05-27 NOTE — PROGRESS NOTES
AUDIOMETRIC EVALUATION      Name:  Dustin Gómez  :  2000  Age:  24 y.o.  Date of Evaluation:  2025       History:  Mr. Gómez is seen today at the request of YULIANA Hobbs for a follow-up hearing evaluation. Patient has a history of grossly normal hearing in both ears with a mild loss at 250 Hz and 8 kHz in the left ear. Previous audio was on 2025.    Audiologic Information:  PETs: significant bilateral history (~11 sets)   Other otologic surgical history: Left myringoplasty 2025  Aural Pressure/Fullness: no  Otalgia: no  Otorrhea: no  Tinnitus: Bilateral constant   Dizziness: no  Other significant history: no    **Case history obtained in office and through EMR system      EVALUATION:        RESULTS:    Otoscopic Evaluation:  Right: clear canal, tympanic membrane visualized  Left: perforation visualized    Tympanometry (226 Hz):  Right: Type Ad  Left:  Could Not Seal - Consistent with perforation     Pure Tone Audiometry:    Right: hearing within normal limits   Left: Grossly normal hearing with one threshold (250Hz) in the mild hearing loss range     Speech Audiometry:   Right: Speech Reception Threshold (SRT) was obtained at 5 dB HL  Word Recognition scores - Excellent (100)% at 50 dB, using NU-6 List 1A with 10 words  Left: Speech Reception Threshold (SRT) was obtained at 5 dB HL  Word Recognition scores - Excellent (100)% at 50 dB, using NU-6 List 2A with 10 words  SRT/PTA in good agreement bilaterally.    IMPRESSIONS:    For the right ear tympanometry showed normal middle ear pressure with increased static compliance, consistent with a hypermobile tympanic membrane.    For the left ear could not seal, likely due to perforation or patent PE tube.       Pure tone thresholds for both ears show hearing within normal limits, suggesting normal outer/middle ear function and normal cochlear/retrocochlear function.     Patient was counseled with regard to the  findings.    Diagnosis:  1. Perforation of left tympanic membrane    2. Hearing within normal limits in both ears         RECOMMENDATIONS/PLAN:  Follow-up recommendations per YULIANA Hobbs.  Practice good communication strategies to assist with everyday listening (eye contact with speakers, reduce background noise, encourage others to communicate clearly and slowly).  Repeat hearing evaluation after medical intervention.  Discussed results and recommendations with patient. Questions were addressed and the patient was encouraged to contact our department should concerns arise.        Mary Beth Lane, CCC-A  Doctor of Audiology

## (undated) DEVICE — TOWEL,OR,DSP,ST,BLUE,DLX,10/PK,8PK/CS: Brand: MEDLINE

## (undated) DEVICE — TOWEL,OR,DSP,ST,BLUE,STD,4/PK,20PK/CS: Brand: MEDLINE

## (undated) DEVICE — SURGICAL SUCTION CONNECTING TUBE WITH MALE CONNECTOR AND SUCTION CLAMP, 2 FT. LONG (.6 M), 5 MM I.D.: Brand: CONMED

## (undated) DEVICE — 4-PORT MANIFOLD: Brand: NEPTUNE 2

## (undated) DEVICE — INTEGRA® MICRO ENT BLADE,DOWNCUTTING BLADE, ANGLED SHAFT: Brand: INTEGRA®

## (undated) DEVICE — TUBING, SUCTION, 1/4" X 12', STRAIGHT: Brand: MEDLINE

## (undated) DEVICE — GLV SURG BIOGEL M LTX PF 7 1/2

## (undated) DEVICE — POSITIONER,HEAD,MULTIRING,36CS: Brand: MEDLINE

## (undated) DEVICE — STERILE COTTON BALLS LARGE 5/P: Brand: MEDLINE